# Patient Record
Sex: MALE
[De-identification: names, ages, dates, MRNs, and addresses within clinical notes are randomized per-mention and may not be internally consistent; named-entity substitution may affect disease eponyms.]

---

## 2020-07-31 PROBLEM — Z00.00 ENCOUNTER FOR PREVENTIVE HEALTH EXAMINATION: Status: ACTIVE | Noted: 2020-07-31

## 2020-08-04 ENCOUNTER — APPOINTMENT (OUTPATIENT)
Dept: OTHER | Facility: CLINIC | Age: 70
End: 2020-08-04
Payer: COMMERCIAL

## 2020-08-04 DIAGNOSIS — I10 ESSENTIAL (PRIMARY) HYPERTENSION: ICD-10-CM

## 2020-08-04 DIAGNOSIS — N40.0 BENIGN PROSTATIC HYPERPLASIA WITHOUT LOWER URINARY TRACT SYMPMS: ICD-10-CM

## 2020-08-04 DIAGNOSIS — K21.9 GASTRO-ESOPHAGEAL REFLUX DISEASE W/OUT ESOPHAGITIS: ICD-10-CM

## 2020-08-04 DIAGNOSIS — E78.5 HYPERLIPIDEMIA, UNSPECIFIED: ICD-10-CM

## 2020-08-04 DIAGNOSIS — Z78.9 OTHER SPECIFIED HEALTH STATUS: ICD-10-CM

## 2020-08-04 PROCEDURE — 99386 PREV VISIT NEW AGE 40-64: CPT | Mod: 95

## 2020-08-04 RX ORDER — ATORVASTATIN CALCIUM 40 MG/1
40 TABLET, FILM COATED ORAL
Refills: 0 | Status: ACTIVE | COMMUNITY
Start: 2020-08-04

## 2020-08-04 RX ORDER — TAMSULOSIN HYDROCHLORIDE 0.4 MG/1
0.4 CAPSULE ORAL
Refills: 0 | Status: ACTIVE | COMMUNITY
Start: 2020-08-04

## 2020-08-04 RX ORDER — IBUPROFEN 200 MG/1
200 CAPSULE, LIQUID FILLED ORAL
Refills: 0 | Status: ACTIVE | COMMUNITY
Start: 2020-08-04

## 2020-08-04 RX ORDER — LISINOPRIL 20 MG/1
20 TABLET ORAL
Refills: 0 | Status: ACTIVE | COMMUNITY
Start: 2020-08-04

## 2020-08-04 RX ORDER — ESOMEPRAZOLE MAGNESIUM 40 MG/1
40 CAPSULE, DELAYED RELEASE ORAL
Refills: 0 | Status: ACTIVE | COMMUNITY
Start: 2020-08-04

## 2020-08-16 ENCOUNTER — FORM ENCOUNTER (OUTPATIENT)
Age: 70
End: 2020-08-16

## 2020-08-17 ENCOUNTER — FORM ENCOUNTER (OUTPATIENT)
Age: 70
End: 2020-08-17

## 2020-09-22 ENCOUNTER — FORM ENCOUNTER (OUTPATIENT)
Age: 70
End: 2020-09-22

## 2020-10-02 ENCOUNTER — APPOINTMENT (OUTPATIENT)
Dept: OTHER | Facility: CLINIC | Age: 70
End: 2020-10-02
Payer: COMMERCIAL

## 2020-10-02 DIAGNOSIS — Z23 ENCOUNTER FOR IMMUNIZATION: ICD-10-CM

## 2020-10-02 DIAGNOSIS — C18.9 MALIGNANT NEOPLASM OF COLON, UNSPECIFIED: ICD-10-CM

## 2020-10-02 PROCEDURE — 99442: CPT | Mod: 95

## 2020-10-02 RX ORDER — CLOPIDOGREL 75 MG/1
75 TABLET, FILM COATED ORAL
Refills: 0 | Status: ACTIVE | COMMUNITY
Start: 2020-10-02

## 2020-10-02 NOTE — ASSESSMENT
[FreeTextEntry1] : patient recently certified for  cancer as C  related \par he will follow up with his Med onc Dr Ruiz and Rad onc Dr Jeffries in a few months for cancer surveillance since he had follow up recently \par  is care will be coordinated by [facility] nurse  navigator \par he will call me \par \par rec flu vaccine - to be administered by PCP

## 2020-10-02 NOTE — DISCUSSION/SUMMARY
[FreeTextEntry3] : Mr. 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 Allan Ruiz- oncologist

## 2020-10-02 NOTE — HISTORY OF PRESENT ILLNESS
[FreeTextEntry1] :   70 year old male [place] [employer]  worked On the pile/in the pit Sep-- Sep-\par \par  [facility] Work Period-Days and Hours\par 	\par \par \par \par 	           Sunday Monday Tuesday Wednesday Thursday	Friday Saturday\par Sept 9- Sept 15			12	12	12	12	12\par Sept 16 - Sept 22	12	12	12	12	12	12	12\par Sept 23 - Sept 29	12	12	12				\par \par \par Worked in [employer]. 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

## 2020-11-19 ENCOUNTER — NON-APPOINTMENT (OUTPATIENT)
Age: 70
End: 2020-11-19

## 2020-12-17 ENCOUNTER — NON-APPOINTMENT (OUTPATIENT)
Age: 70
End: 2020-12-17

## 2021-02-24 ENCOUNTER — NON-APPOINTMENT (OUTPATIENT)
Age: 71
End: 2021-02-24

## 2021-07-16 ENCOUNTER — APPOINTMENT (OUTPATIENT)
Dept: OTHER | Facility: CLINIC | Age: 71
End: 2021-07-16

## 2021-08-17 ENCOUNTER — APPOINTMENT (OUTPATIENT)
Dept: OTHER | Facility: CLINIC | Age: 71
End: 2021-08-17

## 2021-08-18 ENCOUNTER — APPOINTMENT (OUTPATIENT)
Dept: OTHER | Facility: CLINIC | Age: 71
End: 2021-08-18

## 2021-09-28 ENCOUNTER — TRANSCRIPTION ENCOUNTER (OUTPATIENT)
Age: 71
End: 2021-09-28

## 2022-04-19 ENCOUNTER — NON-APPOINTMENT (OUTPATIENT)
Age: 72
End: 2022-04-19

## 2022-06-23 ENCOUNTER — APPOINTMENT (OUTPATIENT)
Dept: OTHER | Facility: CLINIC | Age: 72
End: 2022-06-23
Payer: COMMERCIAL

## 2022-06-23 VITALS
HEIGHT: 68 IN | TEMPERATURE: 97 F | DIASTOLIC BLOOD PRESSURE: 90 MMHG | BODY MASS INDEX: 24.25 KG/M2 | SYSTOLIC BLOOD PRESSURE: 125 MMHG | RESPIRATION RATE: 16 BRPM | WEIGHT: 160 LBS | OXYGEN SATURATION: 98 % | HEART RATE: 84 BPM

## 2022-06-23 DIAGNOSIS — Z04.9 ENCOUNTER FOR EXAMINATION AND OBSERVATION FOR UNSPECIFIED REASON: ICD-10-CM

## 2022-06-23 DIAGNOSIS — C20 MALIGNANT NEOPLASM OF RECTUM: ICD-10-CM

## 2022-06-23 PROCEDURE — 99397 PER PM REEVAL EST PAT 65+ YR: CPT | Mod: 25

## 2022-06-23 RX ORDER — HYDROCORTISONE 1 %
12 CREAM (GRAM) TOPICAL
Qty: 400 | Refills: 0 | Status: ACTIVE | COMMUNITY
Start: 2022-06-10

## 2022-06-23 RX ORDER — PANTOPRAZOLE 40 MG/1
40 TABLET, DELAYED RELEASE ORAL
Qty: 90 | Refills: 0 | Status: ACTIVE | COMMUNITY
Start: 2022-05-28

## 2022-06-23 RX ORDER — ATORVASTATIN CALCIUM 20 MG/1
20 TABLET, FILM COATED ORAL
Qty: 90 | Refills: 0 | Status: ACTIVE | COMMUNITY
Start: 2022-05-28

## 2022-06-23 RX ORDER — METOPROLOL SUCCINATE 25 MG/1
25 CAPSULE, EXTENDED RELEASE ORAL
Qty: 90 | Refills: 0 | Status: ACTIVE | COMMUNITY
Start: 2022-05-28

## 2022-06-23 RX ORDER — LISINOPRIL 10 MG/1
10 TABLET ORAL
Qty: 90 | Refills: 0 | Status: ACTIVE | COMMUNITY
Start: 2022-05-28

## 2022-06-23 RX ORDER — FLUOCINONIDE 0.5 MG/G
0.05 CREAM TOPICAL
Qty: 60 | Refills: 0 | Status: ACTIVE | COMMUNITY
Start: 2022-06-10

## 2022-06-23 NOTE — HEALTH RISK ASSESSMENT
[Patient reported colonoscopy was abnormal] : Patient reported colonoscopy was abnormal [ColonoscopyDate] : 12/2019 [ColonoscopyComments] : 4 cm rectal mass

## 2022-06-23 NOTE — DISCUSSION/SUMMARY
[Patient seen for WTC Monitoring ___] : Patient was seen for WTC monitoring [unfilled] [Please See Note in Chart and Documentation in Trial DB] : Please see note in chart and documentation in Trial DB. [FreeTextEntry3] : Mr. 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 Allan Ruiz- med oncologist \par  he was see by lesley 06 10/2022 \par  he is to schedule appointment with Ayden Lama MD, PhD - colorectal surgeon at [facility] for flexible colonosocpy as was recommended to him by his Rad oncologist \par \par  he will make appointment WTC HTP will provide auth

## 2022-08-18 ENCOUNTER — NON-APPOINTMENT (OUTPATIENT)
Age: 72
End: 2022-08-18

## 2022-10-12 ENCOUNTER — NON-APPOINTMENT (OUTPATIENT)
Age: 72
End: 2022-10-12

## 2023-01-26 ENCOUNTER — NON-APPOINTMENT (OUTPATIENT)
Age: 73
End: 2023-01-26

## 2023-06-14 ENCOUNTER — NON-APPOINTMENT (OUTPATIENT)
Age: 73
End: 2023-06-14

## 2023-10-19 ENCOUNTER — NON-APPOINTMENT (OUTPATIENT)
Age: 73
End: 2023-10-19

## 2023-12-04 ENCOUNTER — APPOINTMENT (OUTPATIENT)
Dept: OTHER | Facility: CLINIC | Age: 73
End: 2023-12-04